# Patient Record
Sex: MALE | Race: WHITE | NOT HISPANIC OR LATINO | Employment: OTHER | URBAN - METROPOLITAN AREA
[De-identification: names, ages, dates, MRNs, and addresses within clinical notes are randomized per-mention and may not be internally consistent; named-entity substitution may affect disease eponyms.]

---

## 2021-07-12 NOTE — PATIENT DISCUSSION
40 minutes was spent in face to face dialogue with patient. Mrs. Lor Hardy is cleared for cataract surgery in both eyes. I will see her on an as needed basis.

## 2022-02-22 ENCOUNTER — NEW PATIENT (OUTPATIENT)
Dept: URBAN - METROPOLITAN AREA CLINIC 39 | Facility: CLINIC | Age: 62
End: 2022-02-22

## 2022-02-22 DIAGNOSIS — H52.13: ICD-10-CM

## 2022-02-22 DIAGNOSIS — H25.812: ICD-10-CM

## 2022-02-22 DIAGNOSIS — H52.4: ICD-10-CM

## 2022-02-22 DIAGNOSIS — H52.203: ICD-10-CM

## 2022-02-22 DIAGNOSIS — H25.811: ICD-10-CM

## 2022-02-22 PROCEDURE — 92004 COMPRE OPH EXAM NEW PT 1/>: CPT

## 2022-02-22 PROCEDURE — 92015 DETERMINE REFRACTIVE STATE: CPT

## 2022-02-22 ASSESSMENT — VISUAL ACUITY
OS_SC: 20/40
OD_SC: J1
OD_SC: 20/200
OS_SC: J2
OD_CC: 20/25-1
OS_CC: 20/25-1

## 2022-02-22 ASSESSMENT — TONOMETRY
OS_IOP_MMHG: 14
OD_IOP_MMHG: 14

## 2022-03-23 ENCOUNTER — CONSULTATION/EVALUATION (OUTPATIENT)
Dept: URBAN - METROPOLITAN AREA CLINIC 43 | Facility: CLINIC | Age: 62
End: 2022-03-23

## 2022-03-23 DIAGNOSIS — H25.812: ICD-10-CM

## 2022-03-23 DIAGNOSIS — H25.811: ICD-10-CM

## 2022-03-23 DIAGNOSIS — H18.513: ICD-10-CM

## 2022-03-23 PROCEDURE — 99214 OFFICE O/P EST MOD 30 MIN: CPT

## 2022-03-23 PROCEDURE — 92134 CPTRZ OPH DX IMG PST SGM RTA: CPT

## 2022-03-23 PROCEDURE — 92025RLE CORNEAL TOPOGRAPHY RLE

## 2022-03-23 PROCEDURE — 92136TC INTERFEROMETRY - TECHNICAL COMPONENT

## 2022-03-23 PROCEDURE — V2799PMN IMPRIMIS PRED-MOXI-NEPAF 5ML

## 2022-03-23 PROCEDURE — 92250 FUNDUS PHOTOGRAPHY W/I&R: CPT

## 2022-03-23 ASSESSMENT — VISUAL ACUITY
OD_SC: 20/80
OS_SC: 20/50
OS_BAT: 20/30
OS_CC: 20/25-2
OS_SC: J1
OD_CC: 20/25-2
OD_BAT: 20/20
OD_SC: J1

## 2022-03-23 ASSESSMENT — TONOMETRY
OS_IOP_MMHG: 10
OD_IOP_MMHG: 11

## 2022-03-23 NOTE — PATIENT DISCUSSION
Patient desires glasses independence after surgery. Advised that vision is limited in OS. Recommending CV only for OS. Recommend CL trial in OS after OD cataract surgery for near vs distance vision with CV. Would not qualify for an  enhancement after surgery.

## 2022-03-23 NOTE — PATIENT DISCUSSION
Patient qualifies for all lens options although discussed limited visual potential OS due to amblyopia.

## 2022-05-24 ENCOUNTER — SURGERY/PROCEDURE (OUTPATIENT)
Dept: URBAN - METROPOLITAN AREA CLINIC 43 | Facility: CLINIC | Age: 62
End: 2022-05-24

## 2022-05-24 ENCOUNTER — PRE-OP/H&P (OUTPATIENT)
Dept: URBAN - METROPOLITAN AREA SURGERY 14 | Facility: SURGERY | Age: 62
End: 2022-05-24

## 2022-05-24 DIAGNOSIS — H18.513: ICD-10-CM

## 2022-05-24 DIAGNOSIS — H25.813: ICD-10-CM

## 2022-05-24 DIAGNOSIS — H52.212: ICD-10-CM

## 2022-05-24 DIAGNOSIS — H04.123: ICD-10-CM

## 2022-05-24 DIAGNOSIS — H53.022: ICD-10-CM

## 2022-05-24 PROCEDURE — 66984AV REMOVE CATARACT, INSERT ADVANCED LENS

## 2022-05-24 PROCEDURE — 66999LNSR LENSAR LASER FOR CAT SX

## 2022-05-24 PROCEDURE — 99211HP H&P OFFICE/OUTPATIENT VISIT, EST

## 2022-05-25 ENCOUNTER — POST OP/EVAL OF SECOND EYE (OUTPATIENT)
Dept: URBAN - METROPOLITAN AREA CLINIC 39 | Facility: CLINIC | Age: 62
End: 2022-05-25

## 2022-05-25 DIAGNOSIS — Z96.1: ICD-10-CM

## 2022-05-25 PROCEDURE — 99024 POSTOP FOLLOW-UP VISIT: CPT

## 2022-05-25 ASSESSMENT — TONOMETRY
OD_IOP_MMHG: 17
OS_IOP_MMHG: 15

## 2022-05-25 ASSESSMENT — VISUAL ACUITY
OS_BAT: 20/30
OD: J10
OD_SC: 20/25
OS_SC: 20/50
OD_SC: J8
OS_SC: J1

## 2022-05-25 NOTE — PATIENT DISCUSSION
5/25/2022:  I double checked MR OS today and pt gets to 20/25 VA OS.  pt ed we could consider NEAR target OS.  I will order CTL trial for NEAR goal OS.

## 2022-06-08 ENCOUNTER — POST-OP (OUTPATIENT)
Dept: URBAN - METROPOLITAN AREA CLINIC 39 | Facility: CLINIC | Age: 62
End: 2022-06-08

## 2022-06-08 DIAGNOSIS — H52.212: ICD-10-CM

## 2022-06-08 DIAGNOSIS — Z96.1: ICD-10-CM

## 2022-06-08 PROCEDURE — 99024 POSTOP FOLLOW-UP VISIT: CPT

## 2022-06-08 PROCEDURE — 92310AV CL MGMNT ADVANCED VISION TRIAL ALL INCL

## 2022-06-08 ASSESSMENT — VISUAL ACUITY
OD_SC: J2
OD_SC: 20/20-2
OS_SC: 20/50
OS_SC: J3

## 2022-06-08 NOTE — PATIENT DISCUSSION
2 Weeks PO: Patient is doing well post-operatively. The importance of post-op drop compliance was emphasized. Drop schedule reviewed with patient. Patient to call if any visual changes or concerns.

## 2022-06-08 NOTE — PATIENT DISCUSSION
6/8/2022:  pt declined to try near goal OS today in office as previously planned.  however patient today wanted to try DVO CTL for sim of CV ALFREDO OS.  inserted that lens today and pt left with this lens.  IDEALLY pt wants ADV IOL OS also.  I will discuss this with YAEL and since pt very happy with OD D and N I do not feel ADV IOL would detract from bilateral DVA/NVA.   However I ed IF pt compares monocular vision DIST/NEAR OD vs OS with ADV IOL then OD will likely be better for both.  pt accepts this.

## 2022-06-10 ENCOUNTER — FOLLOW UP (OUTPATIENT)
Dept: URBAN - METROPOLITAN AREA CLINIC 39 | Facility: CLINIC | Age: 62
End: 2022-06-10

## 2022-06-10 DIAGNOSIS — Z96.1: ICD-10-CM

## 2022-06-10 DIAGNOSIS — H25.812: ICD-10-CM

## 2022-06-10 PROCEDURE — 92310F

## 2022-06-10 ASSESSMENT — VISUAL ACUITY
OS_CC: 20/25-1
OD_SC: 20/20
OD_SC: J1
OS_CC: J6

## 2022-06-10 NOTE — PATIENT DISCUSSION
6/10/2022: pt ed I will discuss with YAEL as pt wishes for ADV IOL OS so that he has some potential to see near also (very happy OD thus far and is seeing sc already with only OD done).   pt is fully informed that will likely not see as well OS as OD no matter which lens we use and pt accepts this.

## 2022-06-14 ENCOUNTER — ADDENDUM (OUTPATIENT)
Dept: URBAN - METROPOLITAN AREA CLINIC 43 | Facility: CLINIC | Age: 62
End: 2022-06-14

## 2022-06-24 ENCOUNTER — PRE-OP/H&P (OUTPATIENT)
Dept: URBAN - METROPOLITAN AREA SURGERY 14 | Facility: SURGERY | Age: 62
End: 2022-06-24

## 2022-06-24 ENCOUNTER — SURGERY/PROCEDURE (OUTPATIENT)
Dept: URBAN - METROPOLITAN AREA CLINIC 43 | Facility: CLINIC | Age: 62
End: 2022-06-24

## 2022-06-24 ENCOUNTER — POST-OP (OUTPATIENT)
Dept: URBAN - METROPOLITAN AREA SURGERY 14 | Facility: SURGERY | Age: 62
End: 2022-06-24

## 2022-06-24 DIAGNOSIS — Z96.1: ICD-10-CM

## 2022-06-24 DIAGNOSIS — H25.812: ICD-10-CM

## 2022-06-24 PROCEDURE — 66999LNSR LENSAR LASER FOR CAT SX

## 2022-06-24 PROCEDURE — 99211HP H&P OFFICE/OUTPATIENT VISIT, EST

## 2022-06-24 PROCEDURE — 99211T TECH SERVICE

## 2022-06-24 PROCEDURE — 66999RAV RLE WITH ADVANCED LENS

## 2022-06-24 ASSESSMENT — TONOMETRY: OS_IOP_MMHG: 11

## 2022-06-27 ENCOUNTER — SURGERY/PROCEDURE (OUTPATIENT)
Dept: URBAN - METROPOLITAN AREA CLINIC 40 | Facility: CLINIC | Age: 62
End: 2022-06-27

## 2022-06-27 ENCOUNTER — POST-OP (OUTPATIENT)
Dept: URBAN - METROPOLITAN AREA CLINIC 40 | Facility: CLINIC | Age: 62
End: 2022-06-27

## 2022-06-27 DIAGNOSIS — Z96.1: ICD-10-CM

## 2022-06-27 PROCEDURE — 99024 POSTOP FOLLOW-UP VISIT: CPT

## 2022-06-27 ASSESSMENT — VISUAL ACUITY
OD_SC: J1
OU_SC: J1+
OS_SC: J1+
OS_SC: 20/40
OD_SC: 20/20-1
OS_PH: 20/25
OU_SC: 20/20

## 2022-06-27 ASSESSMENT — TONOMETRY
OS_IOP_MMHG: 14
OD_IOP_MMHG: 15

## 2022-07-26 ENCOUNTER — POST-OP (OUTPATIENT)
Dept: URBAN - METROPOLITAN AREA CLINIC 39 | Facility: CLINIC | Age: 62
End: 2022-07-26

## 2022-07-26 DIAGNOSIS — Z96.1: ICD-10-CM

## 2022-07-26 PROCEDURE — 99024 POSTOP FOLLOW-UP VISIT: CPT

## 2022-07-26 ASSESSMENT — TONOMETRY
OD_IOP_MMHG: 15
OS_IOP_MMHG: 15

## 2022-07-26 ASSESSMENT — VISUAL ACUITY
OD_SC: J1
OD_SC: 20/20-1
OS_SC: 20/30-2
OS_SC: J1

## 2024-01-01 NOTE — PATIENT DISCUSSION
Patient desires glasses independence after surgery. Advised that vision is limited in OS. Recommending CV only for OS. Recommend CL trial in OS after OD cataract surgery for near vs distance vision with CV. Would not qualify for an  enhancement after surgery. None